# Patient Record
Sex: MALE | Race: WHITE | NOT HISPANIC OR LATINO | Employment: OTHER | URBAN - METROPOLITAN AREA
[De-identification: names, ages, dates, MRNs, and addresses within clinical notes are randomized per-mention and may not be internally consistent; named-entity substitution may affect disease eponyms.]

---

## 2017-12-29 ENCOUNTER — APPOINTMENT (OUTPATIENT)
Dept: LAB | Facility: HOSPITAL | Age: 72
End: 2017-12-29
Payer: MEDICARE

## 2017-12-29 ENCOUNTER — GENERIC CONVERSION - ENCOUNTER (OUTPATIENT)
Dept: OTHER | Facility: OTHER | Age: 72
End: 2017-12-29

## 2017-12-29 ENCOUNTER — OFFICE VISIT (OUTPATIENT)
Dept: URGENT CARE | Facility: CLINIC | Age: 72
End: 2017-12-29
Payer: COMMERCIAL

## 2017-12-29 DIAGNOSIS — R30.0 DYSURIA: ICD-10-CM

## 2017-12-29 LAB
BILIRUB UR QL STRIP: ABNORMAL
CLARITY UR: ABNORMAL
COLOR UR: ABNORMAL
GLUCOSE (HISTORICAL): 2000
HGB UR QL STRIP.AUTO: ABNORMAL
KETONES UR STRIP-MCNC: ABNORMAL MG/DL
LEUKOCYTE ESTERASE UR QL STRIP: ABNORMAL
NITRITE UR QL STRIP: ABNORMAL
PH UR STRIP.AUTO: 6.5 [PH]
PROT UR STRIP-MCNC: 2000 MG/DL
SP GR UR STRIP.AUTO: 1.01
UROBILINOGEN UR QL STRIP.AUTO: 1

## 2017-12-29 PROCEDURE — 87186 SC STD MICRODIL/AGAR DIL: CPT

## 2017-12-29 PROCEDURE — 87077 CULTURE AEROBIC IDENTIFY: CPT

## 2017-12-29 PROCEDURE — G0382 LEV 3 HOSP TYPE B ED VISIT: HCPCS

## 2017-12-29 PROCEDURE — 99283 EMERGENCY DEPT VISIT LOW MDM: CPT

## 2017-12-29 PROCEDURE — 87086 URINE CULTURE/COLONY COUNT: CPT

## 2017-12-31 LAB — BACTERIA UR CULT: ABNORMAL

## 2018-01-01 NOTE — PROGRESS NOTES
Assessment   1  Hyperlipidemia (272 4) (E78 5)   2  Hypertension (401 9) (I10)   3  Acute UTI (599 0) (N39 0)   4  Non-smoker (V49 89) (Z78 9)    Plan   Acute UTI    · Ciprofloxacin HCl - 500 MG Oral Tablet; take 1 tablet every twelve hours  Burning with urination    · Ciprofloxacin HCl - 500 MG Oral Tablet; 1 TABLET NOW   · (1) URINE CULTURE; Source:Urine, Clean Catch; Status:Active - Retrospective By    Protocol Authorization; Requested for:[id number];    · Urine Dip Non-Automated- POC; Status:Resulted - Requires Verification,Retrospective    By Protocol Authorization;   Done: [id number] 12:00AM    Chief Complaint   1  Urinary Frequency  Chief Complaint Free Text Note Form: burning and hesitation urinating x 2 day  it's pink too      History of Present Illness   HPI: Started with urinary frequency and burning on urination 2 days ago  Urinating in small amounts, no split stream  No fever, chills, abd or flank pain  Review of Systems   Focused-Male:      Constitutional: no fever-- and-- no chills  ENT: no sore throat-- and-- no nasal discharge  Cardiovascular: no chest pain  Respiratory: no cough  Gastrointestinal: no nausea-- and-- no vomiting  Musculoskeletal: no myalgias  Integumentary: no rashes  Neurological: no headache-- and-- no dizziness  ROS Reviewed:    ROS reviewed  Past Medical History   Active Problems And Past Medical History Reviewed: The active problems and past medical history were reviewed and updated today  Social History    · Non-smoker (V49 89) (Z78 9)    Current Meds    1  Adult Aspirin EC Low Strength 81 MG Oral Tablet Delayed Release; Therapy: (Recorded:[id number]) to Recorded   2  Daily Multiple Vitamins/Min Oral Tablet; Therapy: (Recorded:[id number]) to Recorded   3  Flomax CAPS; Therapy: (Romana Gleason) to Recorded   4  GlipiZIDE TABS; Therapy: (Romana Gleason) to Recorded   5  Januvia TABS;      Therapy: (Juan Bhatia) to Recorded   6  Lasix 20 MG Oral Tablet; Therapy: (Juan Bhatia) to Recorded   7  Lipitor 20 MG Oral Tablet; Therapy: (Juan Bhatia) to Recorded   8  Lisinopril 20 MG Oral Tablet; Therapy: (Juan Bhatia) to Recorded   9  Metformin HCl 500 MG/5ML SOLN;     Therapy: (Recorded:[id number]) to Recorded  Medication List Reviewed: The medication list was reviewed and updated today  Allergies   1  No Known Drug Allergies    Vitals   Signs   Recorded: [id number] 01:00PM   Temperature: 99 1 F  Heart Rate: 73  Respiration: 14  Systolic: 501  Diastolic: 75  O2 Saturation: 96    Physical Exam        Constitutional      General appearance: No acute distress, well appearing and well nourished  Eyes      Conjunctiva and lids: No swelling, erythema, or discharge  Ears, Nose, Mouth, and Throat      External inspection of ears and nose: Normal        Pulmonary      Respiratory effort: No increased work of breathing or signs of respiratory distress  Auscultation of lungs: Clear to auscultation  Cardiovascular      Auscultation of heart: Normal rate and rhythm, normal S1 and S2, without murmurs  Abdomen no CVA tenderness  Lymphatic      Palpation of lymph nodes in neck: No lymphadenopathy  Musculoskeletal      Gait and station: Normal        Skin      Skin and subcutaneous tissue: Normal without rashes or lesions         Psychiatric      Mood and affect: Normal        Signatures    Electronically signed by : GARY Reinoso; Dec 29 2017  1:28PM EST                       (Author)     Electronically signed by : CHUY Sánchez ; Dec 31 2017 11:15AM EST                       (Co-author)

## 2018-01-23 VITALS
RESPIRATION RATE: 14 BRPM | OXYGEN SATURATION: 96 % | HEART RATE: 73 BPM | SYSTOLIC BLOOD PRESSURE: 167 MMHG | TEMPERATURE: 99.1 F | DIASTOLIC BLOOD PRESSURE: 75 MMHG

## 2018-01-23 NOTE — RESULT NOTES
Verified Results  Urine Dip Non-Automated- POC [id number] 12:00AM Kun Henriquez     Test Name Result Flag Reference   Color Reddish brown A    Clarity Hazy A    Leukocytes large A    Nitrite pos A    Blood large A    Bilirubin neg     Urobilinogen 1 0     Protein 2000 A    Ph 6 5     Specific Gravity 1 010     Ketone neg     Glucose 2000 A    Color Reddish brown A    Clarity Hazy A    Leukocytes large A    Nitrite pos A    Blood large A    Bilirubin neg     Urobilinogen 1 0     Protein 2000 A    Ph 6 5     Specific Gravity 1 010     Ketone neg     Glucose 2000 A              Plan  Burning with urination    · Ciprofloxacin HCl - 500 MG Oral Tablet; 1 TABLET NOW

## 2023-03-05 ENCOUNTER — HOSPITAL ENCOUNTER (EMERGENCY)
Age: 78
Discharge: HOME | End: 2023-03-05
Attending: STUDENT IN AN ORGANIZED HEALTH CARE EDUCATION/TRAINING PROGRAM
Payer: MEDICARE

## 2023-03-05 VITALS
WEIGHT: 300 LBS | OXYGEN SATURATION: 95 % | SYSTOLIC BLOOD PRESSURE: 166 MMHG | BODY MASS INDEX: 44.43 KG/M2 | TEMPERATURE: 98.6 F | RESPIRATION RATE: 16 BRPM | DIASTOLIC BLOOD PRESSURE: 69 MMHG | HEIGHT: 69 IN | HEART RATE: 66 BPM

## 2023-03-05 DIAGNOSIS — N39.0 URINARY TRACT INFECTION WITHOUT HEMATURIA, SITE UNSPECIFIED: Primary | ICD-10-CM

## 2023-03-05 LAB
ALBUMIN SERPL BCG-MCNC: 3.5 G/DL (ref 3.5–5.1)
ALP SERPL-CCNC: 121 U/L (ref 38–126)
ALT SERPL W/O P-5'-P-CCNC: 23 U/L (ref 11–66)
ANION GAP SERPL CALC-SCNC: 11 MEQ/L (ref 8–16)
AST SERPL-CCNC: 22 U/L (ref 5–40)
BACTERIA: ABNORMAL
BASOPHILS ABSOLUTE: 0.1 THOU/MM3 (ref 0–0.1)
BASOPHILS NFR BLD AUTO: 0.7 %
BILIRUB SERPL-MCNC: 0.3 MG/DL (ref 0.3–1.2)
BILIRUB UR QL STRIP: NEGATIVE
BUN SERPL-MCNC: 28 MG/DL (ref 7–22)
CALCIUM SERPL-MCNC: 9.2 MG/DL (ref 8.5–10.5)
CASTS #/AREA URNS LPF: ABNORMAL /LPF
CASTS #/AREA URNS LPF: ABNORMAL /LPF
CHARACTER UR: ABNORMAL
CHARCOAL URNS QL MICRO: ABNORMAL
CHLORIDE SERPL-SCNC: 99 MEQ/L (ref 98–111)
CO2 SERPL-SCNC: 26 MEQ/L (ref 23–33)
COLOR UR: YELLOW
CREAT SERPL-MCNC: 0.8 MG/DL (ref 0.4–1.2)
CRYSTALS URNS QL MICRO: ABNORMAL
DEPRECATED RDW RBC AUTO: 47 FL (ref 35–45)
EOSINOPHIL NFR BLD AUTO: 4 %
EOSINOPHILS ABSOLUTE: 0.4 THOU/MM3 (ref 0–0.4)
EPITHELIAL CELLS, UA: ABNORMAL /HPF
ERYTHROCYTE [DISTWIDTH] IN BLOOD BY AUTOMATED COUNT: 13.2 % (ref 11.5–14.5)
GFR SERPL CREATININE-BSD FRML MDRD: > 60 ML/MIN/1.73M2
GLUCOSE SERPL-MCNC: 145 MG/DL (ref 70–108)
GLUCOSE UR QL STRIP.AUTO: NEGATIVE MG/DL
HCT VFR BLD AUTO: 41.1 % (ref 42–52)
HGB BLD-MCNC: 13.3 GM/DL (ref 14–18)
HGB UR QL STRIP.AUTO: NEGATIVE
IMM GRANULOCYTES # BLD AUTO: 0.07 THOU/MM3 (ref 0–0.07)
IMM GRANULOCYTES NFR BLD AUTO: 0.7 %
KETONES UR QL STRIP.AUTO: NEGATIVE
LEUKOCYTE ESTERASE UR QL STRIP.AUTO: ABNORMAL
LYMPHOCYTES ABSOLUTE: 3.2 THOU/MM3 (ref 1–4.8)
LYMPHOCYTES NFR BLD AUTO: 34.4 %
MCH RBC QN AUTO: 31.4 PG (ref 26–33)
MCHC RBC AUTO-ENTMCNC: 32.4 GM/DL (ref 32.2–35.5)
MCV RBC AUTO: 96.9 FL (ref 80–94)
MONOCYTES ABSOLUTE: 0.6 THOU/MM3 (ref 0.4–1.3)
MONOCYTES NFR BLD AUTO: 6.5 %
MUCOUS THREADS URNS QL MICRO: ABNORMAL
NEUTROPHILS NFR BLD AUTO: 53.7 %
NITRITE UR QL STRIP.AUTO: POSITIVE
NRBC BLD AUTO-RTO: 0 /100 WBC
OSMOLALITY SERPL CALC.SUM OF ELEC: 280 MOSMOL/KG (ref 275–300)
PH UR STRIP.AUTO: >= 9 [PH] (ref 5–9)
PLATELET # BLD AUTO: 292 THOU/MM3 (ref 130–400)
PMV BLD AUTO: 9.4 FL (ref 9.4–12.4)
POTASSIUM SERPL-SCNC: 4.3 MEQ/L (ref 3.5–5.2)
PROT SERPL-MCNC: 7 G/DL (ref 6.1–8)
PROT UR STRIP.AUTO-MCNC: 300 MG/DL
RBC # BLD AUTO: 4.24 MILL/MM3 (ref 4.7–6.1)
RBC #/AREA URNS HPF: ABNORMAL /HPF
RENAL EPI CELLS #/AREA URNS HPF: ABNORMAL /[HPF]
SEGMENTED NEUTROPHILS ABSOLUTE COUNT: 5 THOU/MM3 (ref 1.8–7.7)
SODIUM SERPL-SCNC: 136 MEQ/L (ref 135–145)
SPECIFIC GRAVITY UA: 1.02 (ref 1–1.03)
UROBILINOGEN, URINE: 0.2 EU/DL (ref 0–1)
WBC # BLD AUTO: 9.4 THOU/MM3 (ref 4.8–10.8)
WBC #/AREA URNS HPF: > 100 /HPF
YEAST LIKE FUNGI URNS QL MICRO: ABNORMAL

## 2023-03-05 PROCEDURE — 87086 URINE CULTURE/COLONY COUNT: CPT

## 2023-03-05 PROCEDURE — 81001 URINALYSIS AUTO W/SCOPE: CPT

## 2023-03-05 PROCEDURE — 85025 COMPLETE CBC W/AUTO DIFF WBC: CPT

## 2023-03-05 PROCEDURE — 51702 INSERT TEMP BLADDER CATH: CPT

## 2023-03-05 PROCEDURE — 80053 COMPREHEN METABOLIC PANEL: CPT

## 2023-03-05 PROCEDURE — 36415 COLL VENOUS BLD VENIPUNCTURE: CPT

## 2023-03-05 PROCEDURE — 87186 SC STD MICRODIL/AGAR DIL: CPT

## 2023-03-05 PROCEDURE — 99283 EMERGENCY DEPT VISIT LOW MDM: CPT

## 2023-03-05 PROCEDURE — 87077 CULTURE AEROBIC IDENTIFY: CPT

## 2023-03-05 RX ORDER — CIPROFLOXACIN 500 MG/1
500 TABLET, FILM COATED ORAL 2 TIMES DAILY
Qty: 14 TABLET | Refills: 0 | Status: SHIPPED | OUTPATIENT
Start: 2023-03-05 | End: 2023-03-05 | Stop reason: SDUPTHER

## 2023-03-05 RX ORDER — CIPROFLOXACIN 500 MG/1
500 TABLET, FILM COATED ORAL 2 TIMES DAILY
Qty: 14 TABLET | Refills: 0 | Status: SHIPPED | OUTPATIENT
Start: 2023-03-05 | End: 2023-03-12

## 2023-03-05 ASSESSMENT — PAIN - FUNCTIONAL ASSESSMENT
PAIN_FUNCTIONAL_ASSESSMENT: NONE - DENIES PAIN
PAIN_FUNCTIONAL_ASSESSMENT: NONE - DENIES PAIN

## 2023-03-05 NOTE — ED NOTES
Pt to ER for catheter exchange. He states he has had a catheter for the past year and has to frequently get it exchanged. Upon arrival catheter exchanged at this time. Pt states immediate relief.       [address]  03/05/23 1112

## 2023-03-06 NOTE — ED NOTES
Pt and family updated on POC and express understanding. No additional needs expressed at this time.      Jeancarlos Kc RN  03/05/23 2025

## 2023-03-06 NOTE — ED PROVIDER NOTES
[address] [facility] [address] [address] EMERGENCY DEPT      EMERGENCY MEDICINE     Pt Name: Thuy Kovacs  MRN: [id number]  [place] 1945  Date of evaluation: 3/5/2023  Provider: Ayde Shah MD    CHIEF COMPLAINT       Chief Complaint   Patient presents with    Other     Catheter problem     HISTORY OF PRESENT ILLNESS   Thuy Kovacs is a pleasant 78 y.o. male who presents to the emergency department from from home, by private vehicle for evaluation of poorly draining urinary catheter. Patient states that he said his urinary catheter and today around noon he had a lot of sediment buildup catheter is not draining. Patient states that he recently had his Powers catheter changed about once a month. Patient is visiting family and lives in a different town. Patient denies any pain or discomfort. Patient denies any fevers. PASTMEDICAL HISTORY   No past medical history on file. There is no problem list on file for this patient. SURGICAL HISTORY     No past surgical history on file. CURRENT MEDICATIONS       Discharge Medication List as of 3/5/2023  8:49 PM          ALLERGIES     has No Known Allergies. FAMILY HISTORY     has no family status information on file. SOCIAL HISTORY          PHYSICAL EXAM       ED Triage Vitals [03/05/23 1840]   BP Temp Temp Source Heart Rate Resp SpO2 Height Weight   (!) 151/75 98.6 °F (37 °C) Oral 87 18 97 % 5' 9\" (1.753 m) 300 lb (136.1 kg)       Additional Vital Signs:  Vitals:    03/05/23 2023   BP: (!) 166/69   Pulse: 66   Resp: 16   Temp:    SpO2: 95%     Physical Exam  Constitutional:       Appearance: Normal appearance. HENT:      Head: Normocephalic. Right Ear: External ear normal.      Left Ear: External ear normal.      Nose: Nose normal.      Mouth/Throat:      Mouth: Mucous membranes are moist.      Pharynx: Oropharynx is clear. Eyes:      Conjunctiva/sclera: Conjunctivae normal.      Pupils: Pupils are equal, round, and reactive to light.    Cardiovascular: Rate and Rhythm: Normal rate and regular rhythm. Pulses: Normal pulses. Heart sounds: Normal heart sounds. Pulmonary:      Effort: Pulmonary effort is normal.      Breath sounds: Normal breath sounds. Abdominal:      General: Bowel sounds are normal.      Palpations: Abdomen is soft. Musculoskeletal:         General: Normal range of motion. Cervical back: Normal range of motion and neck supple. Skin:     General: Skin is warm and dry. Capillary Refill: Capillary refill takes less than 2 seconds. Neurological:      General: No focal deficit present. Mental Status: He is alert. Psychiatric:         Mood and Affect: Mood normal.         Behavior: Behavior normal.       FORMAL DIAGNOSTIC RESULTS     RADIOLOGY: Interpretation per the Radiologist below, if available at the time of this note (none if blank):     No orders to display       LABS: (none if blank)  Labs Reviewed   CBC WITH AUTO DIFFERENTIAL - Abnormal; Notable for the following components:       Result Value    RBC 4.24 (*)     Hemoglobin 13.3 (*)     Hematocrit 41.1 (*)     MCV 96.9 (*)     RDW-SD 47.0 (*)     All other components within normal limits   COMPREHENSIVE METABOLIC PANEL W/ REFLEX TO MG FOR LOW K - Abnormal; Notable for the following components:    Glucose 145 (*)     BUN 28 (*)     All other components within normal limits   URINALYSIS WITH MICROSCOPIC - Abnormal; Notable for the following components:    Protein,  (*)     Nitrite, Urine POSITIVE (*)     Leukocyte Esterase, Urine LARGE (*)     Character, Urine CLOUDY (*)     All other components within normal limits   CULTURE, URINE   ANION GAP   OSMOLALITY   GLOMERULAR FILTRATION RATE, ESTIMATED       (Any cultures that may have been sent were not resulted at the time of this patient visit)    [address] / ED COURSE:     1) Number and Complexity of Problems            Problem List This Visit:         Chief Complaint   Patient presents with    Other     Catheter problem            Differential Diagnosis includes (but not limited to): Powers catheter malfunction, UTI, urinary obstruction          2)  Data Reviewed (none if left blank)          My Independent interpretations:         Labs: Mild anemia, no leukocytosis, slightly elevated BUN, urinalysis consistent with UTI and positive for nitrates leukocytes esterase and WBCs greater than 100. External Documentation Reviewed:         Previous patient encounter documents & history available on EMR was reviewed              See Formal Diagnostic Results above for the lab and radiology tests and orders. 3)  Treatment and Disposition         ED Reassessment:               Shared Decision-Making was performed and disposition discussed with the        Patient/Family and questions answered            Code Status: Full      Summary of Patient Presentation:      MDM  Number of Diagnoses or Management Options  Urinary tract infection without hematuria, site unspecified: new, needed workup     Amount and/or Complexity of Data Reviewed  Clinical lab tests: reviewed and ordered  Tests in the radiology section of CPT®: ordered and reviewed    Risk of Complications, Morbidity, and/or Mortality  Presenting problems: high  Management options: high    /   Vitals Reviewed:    Vitals:    03/05/23 1840 03/05/23 2023   BP: (!) 151/75 (!) 166/69   Pulse: 87 66   Resp: 18 16   Temp: 98.6 °F (37 °C)    TempSrc: Oral    SpO2: 97% 95%   Weight: 300 lb (136.1 kg)    Height: 5' 9\" (1.753 m)      Patient is a 82-year-old male who presents to the ED with complaint of poorly draining urinary catheter. No acute abnormality noted on physical exam.  Patient Powers catheter exchanged per nursing staff. Patient urinalysis consistent with UTI.   Patient started on antibiotic therapy and will be discharged home with instructions to follow-up with PCP    The results of pertinent diagnostic studies and exam findings were discussed. The patients provisional diagnosis and plan of care were discussed with the patient and present family who expressed understanding. Any medications were reviewed and indications and risks of medications were discussed with the patient /family present. Strict verbal and written return precautions, instructions and appropriate follow-up provided to  the patient. ED Medications administered this visit:  (None if blank)  Medications - No data to display          DISCHARGE PRESCRIPTIONS: (None if blank)  Discharge Medication List as of 3/5/2023  8:49 PM          FINAL IMPRESSION      1.  Urinary tract infection without hematuria, site unspecified          DISPOSITION/PLAN   DISPOSITION Decision To Discharge 03/05/2023 08:38:47 PM      OUTPATIENT FOLLOW UP THE PATIENT:  [address]   [phone]    Schedule an appointment as soon as possible for a visit in 1 day        MD Jerome Rodas MD  Resident  03/06/23 Nory Sandhu MD  Resident  03/06/23 [id number]       Jerome Bernard MD  Resident  03/06/23 4289

## 2023-03-07 LAB
BACTERIA UR CULT: ABNORMAL
ORGANISM: ABNORMAL

## 2025-07-28 ENCOUNTER — OFFICE (OUTPATIENT)
Dept: URBAN - METROPOLITAN AREA CLINIC 15 | Facility: CLINIC | Age: 80
End: 2025-07-28
Payer: MEDICARE

## 2025-07-28 VITALS
HEART RATE: 65 BPM | TEMPERATURE: 98.2 F | SYSTOLIC BLOOD PRESSURE: 80 MMHG | SYSTOLIC BLOOD PRESSURE: 86 MMHG | DIASTOLIC BLOOD PRESSURE: 40 MMHG | WEIGHT: 294 LBS | OXYGEN SATURATION: 95 % | HEIGHT: 69 IN

## 2025-07-28 DIAGNOSIS — K57.90 DIVERTICULOSIS OF INTESTINE, PART UNSPECIFIED, WITHOUT PERFO: ICD-10-CM

## 2025-07-28 DIAGNOSIS — Z80.0 FAMILY HISTORY OF MALIGNANT NEOPLASM OF DIGESTIVE ORGANS: ICD-10-CM

## 2025-07-28 DIAGNOSIS — K64.0 FIRST DEGREE HEMORRHOIDS: ICD-10-CM

## 2025-07-28 DIAGNOSIS — D64.9 ANEMIA, UNSPECIFIED: ICD-10-CM

## 2025-07-28 PROCEDURE — 99214 OFFICE O/P EST MOD 30 MIN: CPT | Performed by: NURSE PRACTITIONER
